# Patient Record
Sex: FEMALE | Race: OTHER | URBAN - METROPOLITAN AREA
[De-identification: names, ages, dates, MRNs, and addresses within clinical notes are randomized per-mention and may not be internally consistent; named-entity substitution may affect disease eponyms.]

---

## 2017-08-29 ENCOUNTER — OFFICE VISIT (OUTPATIENT)
Dept: OTOLARYNGOLOGY | Facility: CLINIC | Age: 47
End: 2017-08-29

## 2017-08-29 VITALS
SYSTOLIC BLOOD PRESSURE: 101 MMHG | WEIGHT: 123 LBS | HEART RATE: 97 BPM | BODY MASS INDEX: 21.79 KG/M2 | TEMPERATURE: 98 F | HEIGHT: 63 IN | DIASTOLIC BLOOD PRESSURE: 71 MMHG

## 2017-08-29 DIAGNOSIS — J30.9 ALLERGIC RHINITIS, UNSPECIFIED CHRONICITY, UNSPECIFIED SEASONALITY, UNSPECIFIED TRIGGER: ICD-10-CM

## 2017-08-29 DIAGNOSIS — J38.2 VOCAL CORD NODULES: Primary | ICD-10-CM

## 2017-08-29 DIAGNOSIS — J34.2 DEVIATED SEPTUM: ICD-10-CM

## 2017-08-29 PROCEDURE — 99203 OFFICE O/P NEW LOW 30 MIN: CPT | Performed by: OTOLARYNGOLOGY

## 2017-08-29 PROCEDURE — 99212 OFFICE O/P EST SF 10 MIN: CPT | Performed by: OTOLARYNGOLOGY

## 2017-08-29 PROCEDURE — 31575 DIAGNOSTIC LARYNGOSCOPY: CPT | Performed by: OTOLARYNGOLOGY

## 2017-08-29 RX ORDER — AMOXICILLIN AND CLAVULANATE POTASSIUM 500; 125 MG/1; MG/1
1 TABLET, FILM COATED ORAL 2 TIMES DAILY
COMMUNITY
End: 2021-04-30 | Stop reason: ALTCHOICE

## 2017-08-29 RX ORDER — FLUTICASONE PROPIONATE 50 MCG
2 SPRAY, SUSPENSION (ML) NASAL DAILY
Qty: 1 BOTTLE | Refills: 3 | Status: SHIPPED | OUTPATIENT
Start: 2017-08-29 | End: 2021-04-30 | Stop reason: ALTCHOICE

## 2017-08-29 NOTE — PROGRESS NOTES
Mally Campbell is a 41year old female. Patient presents with:  Consult: 8/25 Episode of Vomiting Blood. PCP - Dr. Domenica Mohan referred pt here for LT sided congestion in Ear & Nose / Post Nasal Drip [onset 1 month ago].  Pt also has Scratchy Throat & Change Normal, Tongue - Normal   Fiberoptic laryngoscopy shows bilateral vocal cord nodules with erythema of the vocal cords   Nasal Normal External nose - Normal. 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 I recommended a speech therapy evaluation  - SPEECH THERAPY - EXTERNAL    2.  Allergic rhinitis, unspecified chronicity, unspecified seasonality, unspe

## 2017-09-21 ENCOUNTER — OFFICE VISIT (OUTPATIENT)
Dept: OTOLARYNGOLOGY | Facility: CLINIC | Age: 47
End: 2017-09-21

## 2017-09-21 VITALS
WEIGHT: 123 LBS | SYSTOLIC BLOOD PRESSURE: 90 MMHG | DIASTOLIC BLOOD PRESSURE: 60 MMHG | BODY MASS INDEX: 21.79 KG/M2 | TEMPERATURE: 99 F | HEIGHT: 63 IN

## 2017-09-21 DIAGNOSIS — J38.2 VOCAL CORD NODULES: Primary | ICD-10-CM

## 2017-09-21 PROCEDURE — 99212 OFFICE O/P EST SF 10 MIN: CPT | Performed by: OTOLARYNGOLOGY

## 2017-09-21 PROCEDURE — 31575 DIAGNOSTIC LARYNGOSCOPY: CPT | Performed by: OTOLARYNGOLOGY

## 2017-09-21 PROCEDURE — 99213 OFFICE O/P EST LOW 20 MIN: CPT | Performed by: OTOLARYNGOLOGY

## 2017-09-21 RX ORDER — LORATADINE 10 MG/1
10 TABLET ORAL DAILY
COMMUNITY
End: 2021-04-30

## 2017-09-22 NOTE — PROGRESS NOTES
Yahaira Bledsoe is a 41year old female. Patient presents with:   Follow - Up: 3 week follow up- vocal cord nodules- per pt there is some changes/improvement of symptoms  Follow - Up: 3 week follow up- allergic rhinitis-- per pt there is some changes/improveme Normal. Cranial nerves - Cranial nerves II through XII grossly intact.    Neck Exam Normal Inspection - Normal. Palpation - Normal. Parotid gland - Normal. Thyroid gland - Normal.   Psychiatric Normal Orientation - Oriented to time, place, person & situatio

## (undated) NOTE — LETTER
No referring provider defined for this encounter. 08/29/17        Patient: New Cervantes   YOB: 1970   Date of Visit: 8/29/2017       Dear  Dr. Janae Hernandez,      Thank you for referring New Cervantes to my practice.   Please find my assessm